# Patient Record
Sex: FEMALE
[De-identification: names, ages, dates, MRNs, and addresses within clinical notes are randomized per-mention and may not be internally consistent; named-entity substitution may affect disease eponyms.]

---

## 2019-01-01 ENCOUNTER — HOSPITAL ENCOUNTER (EMERGENCY)
Dept: HOSPITAL 31 - C.ER | Age: 24
Discharge: HOME | End: 2019-01-01
Payer: MEDICARE

## 2019-01-01 VITALS — BODY MASS INDEX: 41.6 KG/M2

## 2019-01-01 VITALS
TEMPERATURE: 98.6 F | RESPIRATION RATE: 16 BRPM | OXYGEN SATURATION: 98 % | DIASTOLIC BLOOD PRESSURE: 81 MMHG | HEART RATE: 88 BPM | SYSTOLIC BLOOD PRESSURE: 110 MMHG

## 2019-01-01 DIAGNOSIS — J02.9: Primary | ICD-10-CM

## 2019-01-01 NOTE — C.PDOC
History Of Present Illness





22 y/o female comes in complaining of a 2 day history of sore throat, body 

aches, and cough. Patient denies fever, nausea, or vomiting. Patient is also 

complaining of ear pressure but no discharge. 





Time Seen by Provider: 01/01/19 15:06


Chief Complaint (Nursing): Flu-like Symptoms


History Per: Patient


History/Exam Limitations: no limitations


Onset/Duration Of Symptoms: Days


Current Symptoms Are (Timing): Still Present





Past Medical History


Reviewed: Historical Data, Nursing Documentation, Vital Signs


Vital Signs: 





                                Last Vital Signs











Temp  98.6 F   01/01/19 15:01


 


Pulse  88   01/01/19 15:01


 


Resp  16   01/01/19 15:01


 


BP  110/81   01/01/19 15:01


 


Pulse Ox  98   01/01/19 15:01











Family History: States: No Known Family Hx





- Social History


Hx Alcohol Use: No


Hx Substance Use: No





- Immunization History


Hx Tetanus Toxoid Vaccination: No


Hx Influenza Vaccination: No


Hx Pneumococcal Vaccination: No





Review Of Systems


Except As Marked, All Systems Reviewed And Found Negative.


ENT: Positive for: Throat Pain (Sore throat), Other (Ear pressure)


Respiratory: Positive for: Cough





Physical Exam





- Physical Exam


Appears: Non-toxic, No Acute Distress


Skin: Warm, Dry


Head: Atraumatic, Normacephalic


Eye(s): bilateral: Normal Inspection


Ear(s): Bilateral: Normal


Oral Mucosa: Moist


Throat: Other (Tonsillar erythema and hypertrophy with scant exudates)


Neck: Supple


Chest: Symmetrical


Cardiovascular: Rhythm Regular, No Murmur


Respiratory: Normal Breath Sounds, No Rales, No Rhonchi, No Wheezing


Gastrointestinal/Abdominal: Soft, No Tenderness


Extremity: Bilateral: Atraumatic, Normal Color And Temperature, Normal ROM


Neurological/Psych: Oriented x3, Normal Speech





ED Course And Treatment


O2 Sat by Pulse Oximetry: 98 (RA)


Pulse Ox Interpretation: Normal





Medical Decision Making


Medical Decision Making: 





Impression: Pharyngitis 





Plan:


--Ibuprofen 600 mg PO


--Zithromax 500 mg PO


--POC








Disposition





- Disposition


Referrals: 


[facility] [Outside]


Disposition: HOME/ ROUTINE


Disposition Time: 15:23


Condition: STABLE


Additional Instructions: 


follow up with your doctor within 2 days


call to make an appointment


take medications as prescribed


return to ER if symptoms worsens or progress


Prescriptions: 


Azithromycin [Zithromax] 250 mg PO DAILY #4 tab


Naproxen [Naprosyn] 500 mg PO BID PRN #16 tab


 PRN Reason: Pain, Moderate (4-7)


Instructions:  Sore Throat, Adult (DC)


Forms:  General Discharge Instructions, [facility] (English), Work Excuse





- Clinical Impression


Clinical Impression: 


 Pharyngitis








- Scribe Statement


The provider has reviewed the documentation as recorded by the Deborah Edouard





Provider Attestation: 





All medical record entries made by the Deborah were at my direction and 

personally dictated by me. I have reviewed the chart and agree that the record 

accurately reflects my personal performance of the history, physical exam, 

medical decision making, and the department course for this patient. I have also

 personally directed, reviewed, and agree with the discharge instructions and 

disposition.